# Patient Record
Sex: MALE | Race: WHITE | NOT HISPANIC OR LATINO | Employment: OTHER | URBAN - METROPOLITAN AREA
[De-identification: names, ages, dates, MRNs, and addresses within clinical notes are randomized per-mention and may not be internally consistent; named-entity substitution may affect disease eponyms.]

---

## 2017-06-06 ENCOUNTER — GENERIC CONVERSION - ENCOUNTER (OUTPATIENT)
Dept: OTHER | Facility: OTHER | Age: 72
End: 2017-06-06

## 2017-06-16 ENCOUNTER — ALLSCRIPTS OFFICE VISIT (OUTPATIENT)
Dept: OTHER | Facility: OTHER | Age: 72
End: 2017-06-16

## 2018-01-09 NOTE — CONSULTS
Assessment    1  Lower back pain (724 2) (M54 5)   2  Degenerative lumbar spinal stenosis (724 02) (M48 06)   3  Lumbar spondylosis (721 3) (M47 816)    Plan  Degenerative lumbar spinal stenosis, Lumbar spondylosis    · Follow-up PRN Evaluation and Treatment  Follow-up  Status: Complete  Done:  [id number] 01:22PM   Ordered; For: Degenerative lumbar spinal stenosis, Lumbar spondylosis; Ordered By: Annamarie Marinelli Performed:  Due: [id number]    Discussion/Summary    Mr Michael Whitten has acute on chronic back pain in the setting of severe spondylotic degeneration  today we reviewed the conservative options including PT, AUBRIE and medications  I explained to him that an extensive decompression/fusion would not guarantee him the outcome he is seeking  He is an avid raphael and wishes to pursue his hobby with greater intensity  I have asked him to return to his PM specialist at the [place] to discuss SCS  I will see him on a PRN basis  The patient has the current Goals: Reduce pain, improve function  The patent has the current Barriers: None  Patient is able to Self-Care  Self Referrals: No Dr Trang Schwartz      Chief Complaint    1  Back Pain  chronic back dominant LBP with occasional right buttock/leg pain x 10 yrs  PT, AUBRIE and medications of benefit until recently  On gabapentin, uses percocet 5's sparingly  denies weakness, loss of coordination  History of Present Illness    [facility] presents with complaints of gradual onset of intermittent episodes of moderate bilateral lower back pain, radiating to the right buttock, right thigh and right lower leg  On a scale of 1 to 10, the patient rates the pain as 5  Episodes started about 9 years ago  Symptoms are made worse by lifting, bending and walking down stairs  Symptoms are unchanged     Associated symptoms include stiffness, but no spasm, no fever, no night sweats, no abdominal pain, no general malaise, no weight loss, no arm numbness, no arm weakness, no leg weakness, no urinary incontinence, no fecal incontinence, no urinary retention and no rash localized to the area of pain    leg numbness (right LE numbness)  Review of Systems    Constitutional: no fever and no chills  Eyes: no eyesight problems  ENT: no hearing loss  Cardiovascular: no chest pain and no palpitations  Respiratory: no shortness of breath, no cough and no wheezing  Gastrointestinal: no nausea  Genitourinary: no incontinence  Musculoskeletal: arthralgias, limb pain and joint stiffness, but as noted in HPI, no joint swelling, no myalgias and no limb swelling  Neurological: numbness, but as noted in HPI, no headache, no tingling, no confusion, no dizziness, no limb weakness, no convulsions, no fainting and no difficulty walking  Psychiatric: anxiety, but no depression  Endocrine: no muscle weakness  Hematologic/Lymphatic: no tendency for easy bleeding and no tendency for easy bruising  Past Medical History    1  History of Mononucleosis (075) (B27 90)    Surgical History    1  History of Eye Surgery    Family History  Father    1  Family history of malignant neoplasm (V16 9) (Z80 9)  Paternal Grandmother    2  Family history of malignant neoplasm (V16 9) (Z80 9)    Social History    · Completed high school   ·    · Never a smoker   · Rarely consumes alcohol (V49 89) (Z78 9)   · Denied: History of Recreational drug use   · Retired   · Two children    Current Meds   1  Allopurinol 300 MG Oral Tablet; TAKE 1 TABLET DAILY; Therapy: (Recorded:16Jun2017) to Recorded   2  Cholecalciferol 2000 UNIT CAPS; take 1 capsule daily; Therapy: (Recorded:16Jun2017) to Recorded   3  [employer] Magnesium TABS; TAKE 1 TABLET DAILY; Therapy: (Recorded:16Jun2017) to Recorded   4  Glucosamine Chondr Complex CAPS; take 1 capsule daily; Therapy: (Recorded:16Jun2017) to Recorded   5  Meloxicam 15 MG Oral Tablet; TAKE 1 TABLET DAILY; Therapy: (Recorded:16Jun2017) to Recorded   6   Omega 3 CAPS; TAKE AS DIRECTED; Therapy: (Recorded:16Jun2017) to Recorded   7  Oxycodone-Acetaminophen 5-325 MG Oral Tablet; TAKE 1 TABLET  PRN; Therapy: (Recorded:16Jun2017) to Recorded   8  Sennosides 8 6 MG TABS; TAKE AS DIRECTED; Therapy: (Recorded:16Jun2017) to Recorded   9  Zolpidem Tartrate 10 MG Oral Tablet; TAKE 1 TABLET AT BEDTIME AS NEEDED FOR   SLEEP; Therapy: (Recorded:16Jun2017) to Recorded    Allergies    1  Tramadol    Vitals  Vital Signs    Recorded: 16Jun2017 11:32AM   Temperature 98 F   Heart Rate 67   Respiration 16   Systolic 952   Diastolic 78   Height 5 ft 10 in   Weight 206 lb    BMI Calculated 29 56   BSA Calculated 2 11   Pain Scale 5     Physical Exam   (full strength bilateral LE, negative SLR, symmetrically depressed DTR, grossly intact sensation, antalgic gait, paravertebral spasm, able to toe and heel walk)   Constitutional Patient appears healthy and well developed  No signs of acute distress present  Eyes Vision is grossly normal  Discs flat with sharp margins  Respiratory Auscultation of lungs: Clear to auscultation bilaterally  Cardiovascular Auscultation of heart: Rate is regular  Rhythm is regular  No heart murmur appreciated  Carotid pulses: 2+ and equal bilaterally, without bruits  Peripheral vascular exam: Pedal Pulses: 2+ and equal bilaterally  Radial pulses: 2+ and equal bilaterally  Extremities: No edema of the lower limbs bilaterally  Abdomen The abdomen is flat  No abdominal scars  Abdomen is soft, nontender, and nondistended  Anus, perineum, and rectum: Exam deferred  Neurologic - Mental Status: Alert and Oriented x3  Mood and affect: Affect is normal   Memory is grossly intact  Attention is WNL  Speech is articulated and fluent  Knowledge and vocabulary consistent with education  Cranial Nerve Exam:  2nd cranial nerve: Visual field was full to confrontation  3rd, 4th, and 6th cranial nerves: Normal with no deficit    5th cranial nerve: Sensation was intact in all three divisions to light touch and temperature  Motor function was intact  7th cranial nerve: Face symmetrical at grimace and at rest  8th cranial nerve: Grossly intact to finger rub bilaterally  9th and 10th cranial nerves: Uvula is midline  11th cranial nerve: Shoulder shrug equal bilaterally  12th cranial nerve: Tongue mideline, no atrophy present  Motor System General Motor Strength: No pronator drift and no parietal drift  Motor System - Upper Extremities: Normal to inspection and palpation  Strength: Deltoids 5/5 bilaterally  Biceps 5/5 bilaterally  Triceps 5/5 bilaterally  Extensor carpi radials is 5/5 bilaterally  Extensor digitorum 5/5 bilaterally  Intrinsic 5/5 bilaterally   5/5 bilaterally  Muscle tone: Normal bilaterally  Muscle Bulk: Normal bilaterally  Motor System - Lower Extremities: Normal to inspection and palpation  Strength: Iliopsoas 5/5 bilaterally  Quadriceps 5/5 bilaterally  Hamstrings 5/5 bilaterally  Gastrocnemius 5/5 bilaterally  Muscle tone: Normal bilaterally  Reflexes: Biceps reflexes are 2+ bilaterally  Triceps reflexes are 2+ bilaterally  Achilles reflexes are 2+ bilaterally  Babinski's reflex is 2+ down going bilaterally  Ankle clonus is absent bilaterally  Coordination: Finger to nose was normal    Sensory: Sensation grossly intact to light touch  Sensation grossly intact to light touch  Gait and Station: [place] with a normal gait  Results/Data    MRI Review severe pan spinal disc and facet degeneration with L4/5 grade 1 spondylolisthesis, associated central and lateral recess stenosis  bone-on-bone changes with spontaneous L5/S1 fusion        Signatures   Electronically signed by : FLOWER Inman ; Jun 16 2017  1:25PM EST                       (Author)

## 2018-01-14 VITALS
BODY MASS INDEX: 29.49 KG/M2 | SYSTOLIC BLOOD PRESSURE: 158 MMHG | WEIGHT: 206 LBS | TEMPERATURE: 98 F | DIASTOLIC BLOOD PRESSURE: 78 MMHG | HEIGHT: 70 IN | HEART RATE: 67 BPM | RESPIRATION RATE: 16 BRPM